# Patient Record
Sex: MALE | Race: WHITE | URBAN - METROPOLITAN AREA
[De-identification: names, ages, dates, MRNs, and addresses within clinical notes are randomized per-mention and may not be internally consistent; named-entity substitution may affect disease eponyms.]

---

## 2018-06-21 ENCOUNTER — OFFICE VISIT (OUTPATIENT)
Dept: UROLOGY | Age: 43
End: 2018-06-21

## 2018-06-21 VITALS
SYSTOLIC BLOOD PRESSURE: 90 MMHG | DIASTOLIC BLOOD PRESSURE: 60 MMHG | HEIGHT: 70 IN | BODY MASS INDEX: 29.06 KG/M2 | OXYGEN SATURATION: 97 % | TEMPERATURE: 99.1 F | HEART RATE: 70 BPM | WEIGHT: 203 LBS

## 2018-06-21 DIAGNOSIS — E29.1 HYPOGONADISM IN MALE: Primary | ICD-10-CM

## 2018-06-21 DIAGNOSIS — N52.02 CORPORO-VENOUS OCCLUSIVE ERECTILE DYSFUNCTION: ICD-10-CM

## 2018-06-21 LAB
BILIRUB UR QL STRIP: NEGATIVE
GLUCOSE UR-MCNC: NEGATIVE MG/DL
KETONES P FAST UR STRIP-MCNC: NEGATIVE MG/DL
PH UR STRIP: 6.5 [PH] (ref 4.6–8)
PROT UR QL STRIP: NEGATIVE
SP GR UR STRIP: 1.01 (ref 1–1.03)
UA UROBILINOGEN AMB POC: NORMAL (ref 0.2–1)
URINALYSIS CLARITY POC: CLEAR
URINALYSIS COLOR POC: YELLOW
URINE BLOOD POC: NEGATIVE
URINE LEUKOCYTES POC: NEGATIVE
URINE NITRITES POC: NEGATIVE

## 2018-06-21 RX ORDER — SILDENAFIL 100 MG/1
100 TABLET, FILM COATED ORAL AS NEEDED
COMMUNITY

## 2018-06-21 NOTE — MR AVS SNAPSHOT
[address] [facility] [address] [address] 
[phone] Patient: Elina Stahl MRN: [id number] GODWIN:[id number] Visit Information Date & Time Provider Department Dept. Phone Encounter #  
 6/21/2018  2:15 PM Pablo Salas, [address] [facility] [phone] Upcoming Health Maintenance Date Due DTaP/Tdap/Td series (1 - Tdap) 9/20/1994 Influenza Age 5 to Adult 8/1/2018 Allergies as of 6/21/2018  Review Complete On: 6/21/2018 By: [place] No Known Allergies Current Immunizations  Never Reviewed No immunizations on file. Not reviewed this visit You Were Diagnosed With   
  
 Codes Comments Hypogonadism in male    -  Primary ICD-10-CM: E29.1 ICD-9-CM: 257.2 Vitals BP Pulse Temp Height(growth percentile) Weight(growth percentile) SpO2  
 90/60 (BP 1 Location: Left arm, BP Patient Position: Sitting) 70 99.1 °F (37.3 °C) (Oral) 5' 10\" (1.778 m) 203 lb (92.1 kg) 97% BMI Smoking Status 29.13 kg/m2 Never Smoker BMI and BSA Data Body Mass Index Body Surface Area  
 29.13 kg/m 2 2.13 m 2 Preferred Pharmacy Pharmacy Name Phone [phone], [address] AT [address] [phone] Your Updated Medication List  
  
   
This list is accurate as of 6/21/18  3:15 PM.  Always use your most recent med list.  
  
  
  
  
 VIAGRA 100 mg tablet Generic drug:  sildenafil citrate Take 100 mg by mouth as needed. We Performed the Following AMB POC URINALYSIS DIP STICK AUTO W/O MICRO [94668 CPT(R)] Patient Instructions Hypogonadism: Care Instructions Your Care Instructions Men who have hypogonadism do not make enough testosterone. This hormone allows men to make sperm and to have normal physical male traits.  The condition also is known as testosterone deficiency. It can lead to loss of sex drive, weakness, impotence, infertility, and weakened bones. Many things can cause this condition. Causes include injured testicles, certain medicines, an infection, and aging. Having a long-term health problem such as kidney or liver disease or being obese can cause it. So can surgery or radiation treatment for another health problem. It also can be present at birth. It is most often treated with testosterone hormone. You can get the hormone as a shot or through a patch or gel on the skin. Follow-up care is a key part of your treatment and safety. Be sure to make and go to all appointments, and call your doctor if you are having problems. It's also a good idea to know your test results and keep a list of the medicines you take. How can you care for yourself at home? · Take your medicines exactly as prescribed. Call your doctor if you think you are having a problem with your medicine. You will get more details on the specific medicines your doctor prescribes. · Follow your treatment plan. If you use testosterone hormones, follow your doctor's instructions. Hormones can help relieve many of the effects of this condition, such as impotence. But it may take weeks or months for your symptoms to improve. · Get plenty of exercise. And make sure to get plenty of calcium and vitamin D in your diet. Eat more dairy foods and green vegetables. They can help keep your bones from getting weak. · If you have a hard time dealing with this condition, talk to your doctor about joining a support group. Talking with others who have the same problems can help you cope. When should you call for help? Call your doctor now or seek immediate medical care if: 
? · You have headaches. ? · You have problems with your vision. ? Watch closely for changes in your health, and be sure to contact your doctor if: ? · You have trouble getting or keeping an erection. ? · You have a loss of body hair. ? · You feel weak or tired a lot of the time. ? · Your breasts are getting larger. ? · You do not get better as expected. Where can you learn more? Go to http://zeenat-zachery.info/. Enter 99 [id number] in the search box to learn more about \"Hypogonadism: Care Instructions. \" Current as of: May 12, 2017 Content Version: 11.4 © 3875-7090 [employer]. Care instructions adapted under license by [employer] (which disclaims liability or warranty for this information). If you have questions about a medical condition or this instruction, always ask your healthcare professional. [address] any warranty or liability for your use of this information. Introducing [facility] & HE[facility]! [employer] introduces [employer] patient portal. Now you can access parts of your medical record, email your doctor's office, and request medication refills online. 1. In your internet browser, go to [email or web address] 2. Click on the First Time User? Click Here link in the Sign In box. You will see the New Member Sign Up page. 3. Enter your [employer] Access Code exactly as it appears below. You will not need to use this code after youve completed the sign-up process. If you do not sign up before the expiration date, you must request a new code. · [employer] Access Code: [email or web address] Expires: 9/19/2018  3:15 PM 
 
4. Enter the last four digits of your Social Security Number (xxxx) and Date of Birth (mm/dd/yyyy) as indicated and click Submit. You will be taken to the next sign-up page. 5. Create a [employer] Software ID. This will be your [employer] login ID and cannot be changed, so think of one that is secure and easy to remember. 6. Create a [employer] password. You can change your password at any time. 7. Enter your Password Reset Question and Answer. This can be used at a later time if you forget your password. 8. Enter your e-mail address. You will receive e-mail notification when new information is available in [address] 9. Click Sign Up. You can now view and download portions of your medical record. 10. Click the Download Summary menu link to download a portable copy of your medical information. If you have questions, please visit the Frequently Asked Questions section of the [employer] website. Remember, [employer] is NOT to be used for urgent needs. For medical emergencies, dial 911. Now available from your iPhone and Android! Please provide this summary of care documentation to your next provider. Your primary care clinician is listed as Wesly Baez. If you have any questions after today's visit, please call [phone].

## 2018-06-21 NOTE — PATIENT INSTRUCTIONS
Hypogonadism: Care Instructions  Your Care Instructions    Men who have hypogonadism do not make enough testosterone. This hormone allows men to make sperm and to have normal physical male traits. The condition also is known as testosterone deficiency. It can lead to loss of sex drive, weakness, impotence, infertility, and weakened bones. Many things can cause this condition. Causes include injured testicles, certain medicines, an infection, and aging. Having a long-term health problem such as kidney or liver disease or being obese can cause it. So can surgery or radiation treatment for another health problem. It also can be present at birth. It is most often treated with testosterone hormone. You can get the hormone as a shot or through a patch or gel on the skin. Follow-up care is a key part of your treatment and safety. Be sure to make and go to all appointments, and call your doctor if you are having problems. It's also a good idea to know your test results and keep a list of the medicines you take. How can you care for yourself at home? · Take your medicines exactly as prescribed. Call your doctor if you think you are having a problem with your medicine. You will get more details on the specific medicines your doctor prescribes. · Follow your treatment plan. If you use testosterone hormones, follow your doctor's instructions. Hormones can help relieve many of the effects of this condition, such as impotence. But it may take weeks or months for your symptoms to improve. · Get plenty of exercise. And make sure to get plenty of calcium and vitamin D in your diet. Eat more dairy foods and green vegetables. They can help keep your bones from getting weak. · If you have a hard time dealing with this condition, talk to your doctor about joining a support group. Talking with others who have the same problems can help you cope. When should you call for help?   Call your doctor now or seek immediate medical care if:  ? · You have headaches. ? · You have problems with your vision. ? Watch closely for changes in your health, and be sure to contact your doctor if:  ? · You have trouble getting or keeping an erection. ? · You have a loss of body hair. ? · You feel weak or tired a lot of the time. ? · Your breasts are getting larger. ? · You do not get better as expected. Where can you learn more? Go to http://zeenat-zachery.info/. Enter 99 [id number] in the search box to learn more about \"Hypogonadism: Care Instructions. \"  Current as of: May 12, 2017  Content Version: 11.4  © 5365-5211 Healthwise, [employer]. Care instructions adapted under license by [employer] (which disclaims liability or warranty for this information). If you have questions about a medical condition or this instruction, always ask your healthcare professional. [address] any warranty or liability for your use of this information.

## 2018-06-21 NOTE — PROGRESS NOTES
Mr. Comfort Zaragoza has a reminder for a \"due or due soon\" health maintenance. I have asked that he contact his primary care provider for follow-up on this health maintenance.

## 2018-06-22 NOTE — PROGRESS NOTES
Crescencio Díaz 38 y.o. 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 evaluation of affected hypogonadism  Describes fatigue lack of energy waning libido and disinterest in physical pursuits  No irritative or obstructive voiding symptoms  No history of  tract disease, trauma, or surgery  irregular bowel movements with periodic bouts of diarrhea  also complains of erectile dysfunction    Testosterone 482 in 2016  Testosterone 363 in May 2018    Review of Systems:   CNS: No seizure syncope headaches dizziness or visual changes  Respiratory: No wheezing or shortness of breath no chest pain no coughing  Cardiovascular: No angina no palpitations  Intestinal; constipation-diarrhea  Urinary: No urgency frequency dysuria or hematuria  Skeletal: No bone or joint pain  Endocrine: No diabetes or thyroid disease  Other:    Allergies: No Known Allergies   Medications:    Current Outpatient Prescriptions   Medication Sig Dispense Refill    sildenafil citrate (VIAGRA) 100 mg tablet Take 100 mg by mouth as needed. History reviewed. No pertinent past medical history. History reviewed. No pertinent surgical history.   Family History   Problem Relation Age of Onset   Diana Abdullahi Cancer Mother     Heart Disease Mother     Cancer Father         Physical Examination: Well-nourished mature male in no apparent distress    Abdomen is nontender no palpable masses no organomegaly  Back-no percussion CVA tenderness on either side  No inguinal hernia or adenopathy  Penis is normal  Testes are normal in size shape and consistency bilaterally-no epididymal induration or tenderness on either side  Spermatic cords are palpably normal bilaterally  Scrotum is normal  Prostate by ZHOU is rounded, smooth, benign in consistency and nontender-no induration no nodularity  No rectal masses tenderness or induration    Urinalysis: Negative dipstick/nitrite negative    Impression: Erectile dysfunction                        Hypogonadism is unlikely responsible for patient's symptoms-testosterone levels                       are within the normal range    Plan: Reassurance    Described discussed likely factors affecting erectile dysfunction at age 38 in an otherwise normally healthy male-  psychological factors are most likely responsible-    rtc prn symptoms of  tract disease        Darline Rosales MD  -electronically signed-    PLEASE NOTE:  This document has been produced using voice recognition software. Unrecognized errors in transcription may be present.

## 2023-04-27 ENCOUNTER — WALK IN (OUTPATIENT)
Dept: URGENT CARE | Age: 48
End: 2023-04-27

## 2023-04-27 VITALS
WEIGHT: 209 LBS | RESPIRATION RATE: 20 BRPM | DIASTOLIC BLOOD PRESSURE: 70 MMHG | OXYGEN SATURATION: 97 % | HEART RATE: 77 BPM | TEMPERATURE: 99.4 F | SYSTOLIC BLOOD PRESSURE: 112 MMHG | HEIGHT: 70 IN | BODY MASS INDEX: 29.92 KG/M2

## 2023-04-27 DIAGNOSIS — J06.9 VIRAL URI: Primary | ICD-10-CM

## 2023-04-27 LAB
FLUAV RNA RESP QL NAA+PROBE: NOT DETECTED
FLUBV RNA RESP QL NAA+PROBE: NOT DETECTED
RSV AG NPH QL IA.RAPID: NOT DETECTED
SARS-COV-2 RNA RESP QL NAA+PROBE: NOT DETECTED

## 2023-04-27 PROCEDURE — 99214 OFFICE O/P EST MOD 30 MIN: CPT | Performed by: REGISTERED NURSE

## 2023-04-27 PROCEDURE — 0241U POCT COVID/FLU/RSV PANEL: CPT | Performed by: REGISTERED NURSE

## 2023-04-27 ASSESSMENT — ENCOUNTER SYMPTOMS
VOMITING: 0
WHEEZING: 0
DIZZINESS: 0
HEADACHES: 0
RHINORRHEA: 1
SINUS PRESSURE: 0
COUGH: 1
TROUBLE SWALLOWING: 0
CHEST TIGHTNESS: 0
FEVER: 0
SHORTNESS OF BREATH: 0
FATIGUE: 0
LIGHT-HEADEDNESS: 0
SORE THROAT: 0
DIAPHORESIS: 0
DIARRHEA: 0
ABDOMINAL PAIN: 0
APPETITE CHANGE: 0
CHILLS: 1